# Patient Record
Sex: FEMALE | NOT HISPANIC OR LATINO | URBAN - METROPOLITAN AREA
[De-identification: names, ages, dates, MRNs, and addresses within clinical notes are randomized per-mention and may not be internally consistent; named-entity substitution may affect disease eponyms.]

---

## 2018-02-01 ENCOUNTER — IMPORTED ENCOUNTER (OUTPATIENT)
Dept: URBAN - METROPOLITAN AREA CLINIC 1 | Facility: CLINIC | Age: 33
End: 2018-02-01

## 2018-02-01 PROBLEM — H04.123: Noted: 2018-02-01

## 2018-02-01 PROBLEM — H40.013: Noted: 2018-02-01

## 2018-02-01 PROCEDURE — 92014 COMPRE OPH EXAM EST PT 1/>: CPT

## 2018-02-01 PROCEDURE — 92015 DETERMINE REFRACTIVE STATE: CPT

## 2018-02-01 PROCEDURE — 92133 CPTRZD OPH DX IMG PST SGM ON: CPT

## 2018-02-01 NOTE — PATIENT DISCUSSION
1. COAG Suspect OU: (0.70/0.70)  IOP was 15/15. Fm HX. Condition was discussed with patient. Will monitor patient for progression. OCT was done today results was temporal thinning OD normal OS2. Dry Eyes OU -- Recommended to patient to use Artificial Tears BID OU3. Return for an appointment in 1 month for 10 and VF 24-2 with Dr. Mireya Tovar.

## 2018-03-02 ENCOUNTER — IMPORTED ENCOUNTER (OUTPATIENT)
Dept: URBAN - METROPOLITAN AREA CLINIC 1 | Facility: CLINIC | Age: 33
End: 2018-03-02

## 2018-03-02 PROBLEM — H40.023: Noted: 2018-03-02

## 2018-03-02 PROCEDURE — 99213 OFFICE O/P EST LOW 20 MIN: CPT

## 2018-03-02 PROCEDURE — 92083 EXTENDED VISUAL FIELD XM: CPT

## 2018-03-02 NOTE — PATIENT DISCUSSION
1. COAG suspect OU: (.7 OU) FHX Low test reliability OS wnl OD. Condition was discussed with patient. Will monitor patient for progression. 2.  Return for an appointment for 1yr/30 OCT with Dr. Kat Duncan.

## 2018-03-16 ENCOUNTER — IMPORTED ENCOUNTER (OUTPATIENT)
Dept: URBAN - METROPOLITAN AREA CLINIC 1 | Facility: CLINIC | Age: 33
End: 2018-03-16

## 2019-03-08 ENCOUNTER — IMPORTED ENCOUNTER (OUTPATIENT)
Dept: URBAN - METROPOLITAN AREA CLINIC 1 | Facility: CLINIC | Age: 34
End: 2019-03-08

## 2019-03-08 PROBLEM — H04.123: Noted: 2019-03-08

## 2019-03-08 PROBLEM — H16.143: Noted: 2019-03-08

## 2019-03-08 PROBLEM — H40.023: Noted: 2019-03-08

## 2019-03-08 PROCEDURE — 92133 CPTRZD OPH DX IMG PST SGM ON: CPT

## 2019-03-08 PROCEDURE — 92014 COMPRE OPH EXAM EST PT 1/>: CPT

## 2019-03-08 NOTE — PATIENT DISCUSSION
1.  Glaucoma Suspect OU (CD: 0.70 OU) -- IOP stable today at 18 / 16. AA. Positive Family h/o Glaucoma. OCT today shows WNL OU. Patient is considered high risk. Condition was discussed with patient and patient understands. Will continue to monitor patient for any progression in condition. Patient was advised to call us with any problems questions or concerns. 2.  BILLY w/ PEK OU -- Recommend continue the frequent use of OTC AT's BID-QID OU Routinely. Return for an appointment in 1 MO for a 36 / CC OU with Dr. Vickie Ybarra. Return for an appointment in 1 YR for a 30 / 24-2 HVF OU with Dr. Vickie Ybarra. Patient defers the refraction at today's visit (pt will return under [employer] / Insurance).

## 2021-04-29 ENCOUNTER — IMPORTED ENCOUNTER (OUTPATIENT)
Dept: URBAN - METROPOLITAN AREA CLINIC 1 | Facility: CLINIC | Age: 36
End: 2021-04-29

## 2021-04-29 PROBLEM — H44.23: Noted: 2021-04-29

## 2021-04-29 PROBLEM — H52.223: Noted: 2021-04-29

## 2021-04-29 PROCEDURE — S0621 ROUTINE OPHTHALMOLOGICAL EXA: HCPCS

## 2021-04-29 NOTE — PATIENT DISCUSSION
1.  High Myopia w/ Astigmatism OU -- Rx was given for correction if indicated and requested. 2. BILLY w/ PEK OU -- Cont ATs BID OU routinely (Sample of Refresh for Contacts given). 3.  Glaucoma Suspect OU (Low Risk) -- (CD: 0.70 OU) IOP stable at 15 OU. T-Max 18/16. () Family Hx. AA. Finalized CTL Rx and given to patient ([employer] XR). Return for an appointment in 6 months 30/OCT with Dr. Kat Duncan. Return for an appointment in 1 year 40/cc with Dr. Kat Duncan.

## 2022-03-12 ASSESSMENT — VISUAL ACUITY
OD_SC: 20/25
OS_SC: 20/20-1
OD_SC: 20/20
OS_SC: 20/25-2
OS_SC: 20/25
OD_SC: 20/25
OD_SC: 20/20
OD_CC: J1+
OD_SC: 20/20
OD_CC: J1+
OS_CC: J1+
OD_CC: J1
OS_SC: 20/20
OS_CC: J1
OS_CC: J1+
OS_SC: 20/25

## 2022-03-12 ASSESSMENT — KERATOMETRY
OD_K1POWER_DIOPTERS: 47.00
OS_AXISANGLE2_DEGREES: 086
OD_K2POWER_DIOPTERS: 50.00
OS_K2POWER_DIOPTERS: 50.00
OS_AXISANGLE_DEGREES: 176
OD_AXISANGLE2_DEGREES: 082
OD_AXISANGLE_DEGREES: 172
OS_K1POWER_DIOPTERS: 46.25

## 2022-03-12 ASSESSMENT — TONOMETRY
OS_IOP_MMHG: 15
OD_IOP_MMHG: 16
OD_IOP_MMHG: 18
OS_IOP_MMHG: 15
OS_IOP_MMHG: 16
OD_IOP_MMHG: 15
OS_IOP_MMHG: 16
OD_IOP_MMHG: 15

## 2023-04-06 ENCOUNTER — COMPREHENSIVE EXAM (OUTPATIENT)
Dept: URBAN - METROPOLITAN AREA CLINIC 1 | Facility: CLINIC | Age: 38
End: 2023-04-06

## 2023-04-06 DIAGNOSIS — H52.13: ICD-10-CM

## 2023-04-06 DIAGNOSIS — H52.223: ICD-10-CM

## 2023-04-06 PROCEDURE — 92015 DETERMINE REFRACTIVE STATE: CPT

## 2023-04-06 PROCEDURE — 92014 COMPRE OPH EXAM EST PT 1/>: CPT

## 2023-04-06 ASSESSMENT — KERATOMETRY
OS_AXISANGLE_DEGREES: 176
OD_AXISANGLE_DEGREES: 172
OS_K1POWER_DIOPTERS: 46.25
OD_K1POWER_DIOPTERS: 47.00
OD_K2POWER_DIOPTERS: 50.00
OS_AXISANGLE2_DEGREES: 086
OS_K2POWER_DIOPTERS: 50.00
OD_AXISANGLE2_DEGREES: 082

## 2023-04-06 ASSESSMENT — VISUAL ACUITY
OD_CC: 20/25-2
OD_CC: J1+
OS_CC: 20/20-2
OS_CC: J1-1

## 2023-04-06 ASSESSMENT — TONOMETRY
OD_IOP_MMHG: 16
OS_IOP_MMHG: 16

## 2023-05-04 ENCOUNTER — COMPREHENSIVE EXAM (OUTPATIENT)
Dept: URBAN - METROPOLITAN AREA CLINIC 1 | Facility: CLINIC | Age: 38
End: 2023-05-04

## 2023-05-04 DIAGNOSIS — H04.123: ICD-10-CM

## 2023-05-04 DIAGNOSIS — H40.013: ICD-10-CM

## 2023-05-04 DIAGNOSIS — H16.143: ICD-10-CM

## 2023-05-04 PROCEDURE — 92014 COMPRE OPH EXAM EST PT 1/>: CPT

## 2023-05-04 PROCEDURE — 92133 CPTRZD OPH DX IMG PST SGM ON: CPT

## 2023-05-04 ASSESSMENT — KERATOMETRY
OS_AXISANGLE_DEGREES: 176
OS_AXISANGLE2_DEGREES: 086
OD_AXISANGLE_DEGREES: 172
OS_K2POWER_DIOPTERS: 50.00
OS_K1POWER_DIOPTERS: 46.25
OD_K2POWER_DIOPTERS: 50.00
OD_K1POWER_DIOPTERS: 47.00
OD_AXISANGLE2_DEGREES: 082

## 2023-05-04 ASSESSMENT — VISUAL ACUITY
OS_CC: 20/20-1
OD_CC: 20/20-2

## 2023-05-04 ASSESSMENT — TONOMETRY
OS_IOP_MMHG: 15
OD_IOP_MMHG: 16